# Patient Record
Sex: MALE | Race: WHITE | NOT HISPANIC OR LATINO | Employment: FULL TIME | URBAN - METROPOLITAN AREA
[De-identification: names, ages, dates, MRNs, and addresses within clinical notes are randomized per-mention and may not be internally consistent; named-entity substitution may affect disease eponyms.]

---

## 2017-03-15 ENCOUNTER — TRANSFERRED RECORDS (OUTPATIENT)
Dept: HEALTH INFORMATION MANAGEMENT | Facility: CLINIC | Age: 32
End: 2017-03-15

## 2017-10-06 ENCOUNTER — TRANSFERRED RECORDS (OUTPATIENT)
Dept: HEALTH INFORMATION MANAGEMENT | Facility: CLINIC | Age: 32
End: 2017-10-06

## 2018-04-20 ENCOUNTER — TRANSFERRED RECORDS (OUTPATIENT)
Dept: HEALTH INFORMATION MANAGEMENT | Facility: CLINIC | Age: 33
End: 2018-04-20

## 2019-11-05 ENCOUNTER — HEALTH MAINTENANCE LETTER (OUTPATIENT)
Age: 34
End: 2019-11-05

## 2020-11-22 ENCOUNTER — HEALTH MAINTENANCE LETTER (OUTPATIENT)
Age: 35
End: 2020-11-22

## 2021-09-19 ENCOUNTER — HEALTH MAINTENANCE LETTER (OUTPATIENT)
Age: 36
End: 2021-09-19

## 2022-01-09 ENCOUNTER — HEALTH MAINTENANCE LETTER (OUTPATIENT)
Age: 37
End: 2022-01-09

## 2022-09-25 PROBLEM — Z85.47 HISTORY OF TESTICULAR CANCER: Status: ACTIVE | Noted: 2022-09-25

## 2022-09-26 ENCOUNTER — OFFICE VISIT (OUTPATIENT)
Dept: INTERNAL MEDICINE | Facility: CLINIC | Age: 37
End: 2022-09-26
Payer: COMMERCIAL

## 2022-09-26 VITALS
TEMPERATURE: 97 F | HEIGHT: 72 IN | WEIGHT: 281.2 LBS | OXYGEN SATURATION: 97 % | DIASTOLIC BLOOD PRESSURE: 85 MMHG | BODY MASS INDEX: 38.09 KG/M2 | HEART RATE: 86 BPM | SYSTOLIC BLOOD PRESSURE: 152 MMHG

## 2022-09-26 DIAGNOSIS — Z23 NEED FOR VACCINATION: ICD-10-CM

## 2022-09-26 DIAGNOSIS — Z13.220 SCREENING FOR CHOLESTEROL LEVEL: ICD-10-CM

## 2022-09-26 DIAGNOSIS — E66.01 MORBID OBESITY (H): ICD-10-CM

## 2022-09-26 DIAGNOSIS — Z00.00 ROUTINE HISTORY AND PHYSICAL EXAMINATION OF ADULT: Primary | ICD-10-CM

## 2022-09-26 DIAGNOSIS — I10 BENIGN ESSENTIAL HYPERTENSION: ICD-10-CM

## 2022-09-26 DIAGNOSIS — Z13.1 SCREENING FOR DIABETES MELLITUS: ICD-10-CM

## 2022-09-26 DIAGNOSIS — Z23 NEED FOR PROPHYLACTIC VACCINATION AND INOCULATION AGAINST INFLUENZA: ICD-10-CM

## 2022-09-26 PROBLEM — E66.9 OBESITY (BMI 30-39.9): Status: ACTIVE | Noted: 2022-09-26

## 2022-09-26 PROBLEM — E78.00 PURE HYPERCHOLESTEROLEMIA: Status: ACTIVE | Noted: 2022-09-26

## 2022-09-26 PROBLEM — R73.01 IMPAIRED FASTING GLUCOSE: Status: ACTIVE | Noted: 2022-09-26

## 2022-09-26 LAB
ALBUMIN SERPL-MCNC: 3.9 G/DL (ref 3.4–5)
ALP SERPL-CCNC: 85 U/L (ref 40–150)
ALT SERPL W P-5'-P-CCNC: 36 U/L (ref 0–70)
ANION GAP SERPL CALCULATED.3IONS-SCNC: 7 MMOL/L (ref 3–14)
AST SERPL W P-5'-P-CCNC: 17 U/L (ref 0–45)
BILIRUB SERPL-MCNC: 0.3 MG/DL (ref 0.2–1.3)
BUN SERPL-MCNC: 16 MG/DL (ref 7–30)
CALCIUM SERPL-MCNC: 8.4 MG/DL (ref 8.5–10.1)
CHLORIDE BLD-SCNC: 109 MMOL/L (ref 94–109)
CHOLEST SERPL-MCNC: 241 MG/DL
CO2 SERPL-SCNC: 23 MMOL/L (ref 20–32)
CREAT SERPL-MCNC: 0.87 MG/DL (ref 0.66–1.25)
FASTING STATUS PATIENT QL REPORTED: YES
GFR SERPL CREATININE-BSD FRML MDRD: >90 ML/MIN/1.73M2
GLUCOSE BLD-MCNC: 118 MG/DL (ref 70–99)
HDLC SERPL-MCNC: 32 MG/DL
LDLC SERPL CALC-MCNC: 175 MG/DL
NONHDLC SERPL-MCNC: 209 MG/DL
POTASSIUM BLD-SCNC: 4.3 MMOL/L (ref 3.4–5.3)
PROT SERPL-MCNC: 7.1 G/DL (ref 6.8–8.8)
SODIUM SERPL-SCNC: 139 MMOL/L (ref 133–144)
TRIGL SERPL-MCNC: 169 MG/DL

## 2022-09-26 PROCEDURE — 90715 TDAP VACCINE 7 YRS/> IM: CPT | Performed by: INTERNAL MEDICINE

## 2022-09-26 PROCEDURE — 90471 IMMUNIZATION ADMIN: CPT | Performed by: INTERNAL MEDICINE

## 2022-09-26 PROCEDURE — 99385 PREV VISIT NEW AGE 18-39: CPT | Mod: 25 | Performed by: INTERNAL MEDICINE

## 2022-09-26 PROCEDURE — 36415 COLL VENOUS BLD VENIPUNCTURE: CPT | Performed by: INTERNAL MEDICINE

## 2022-09-26 PROCEDURE — 99214 OFFICE O/P EST MOD 30 MIN: CPT | Mod: 25 | Performed by: INTERNAL MEDICINE

## 2022-09-26 PROCEDURE — 90472 IMMUNIZATION ADMIN EACH ADD: CPT | Performed by: INTERNAL MEDICINE

## 2022-09-26 PROCEDURE — 80053 COMPREHEN METABOLIC PANEL: CPT | Performed by: INTERNAL MEDICINE

## 2022-09-26 PROCEDURE — 93000 ELECTROCARDIOGRAM COMPLETE: CPT | Performed by: INTERNAL MEDICINE

## 2022-09-26 PROCEDURE — 90686 IIV4 VACC NO PRSV 0.5 ML IM: CPT | Performed by: INTERNAL MEDICINE

## 2022-09-26 PROCEDURE — 80061 LIPID PANEL: CPT | Performed by: INTERNAL MEDICINE

## 2022-09-26 RX ORDER — AMLODIPINE BESYLATE 5 MG/1
5 TABLET ORAL DAILY
Qty: 90 TABLET | Refills: 3 | Status: SHIPPED | OUTPATIENT
Start: 2022-09-26 | End: 2022-10-24

## 2022-09-26 ASSESSMENT — ENCOUNTER SYMPTOMS
SHORTNESS OF BREATH: 0
ARTHRALGIAS: 0
WEAKNESS: 0
PARESTHESIAS: 0
DIZZINESS: 0
FREQUENCY: 0
DIARRHEA: 0
FEVER: 0
DYSURIA: 0
HEADACHES: 0
CONSTIPATION: 0
HEMATOCHEZIA: 0
NAUSEA: 0
HEMATURIA: 0
JOINT SWELLING: 0
MYALGIAS: 0
CHILLS: 0
PALPITATIONS: 0
HEARTBURN: 0
ABDOMINAL PAIN: 0
COUGH: 0
NERVOUS/ANXIOUS: 1
SORE THROAT: 0
EYE PAIN: 0

## 2022-09-26 NOTE — PATIENT INSTRUCTIONS
Flu shot and tetanus booster today.    EKG today.    ---    Please stop at the  on the way out today to schedule a blood pressure follow-up for 2-4 weeks from now (show them the white sheet).    ---    Fasting labs downstairs. Remember to pull a number and given them the salmon colored sheet.    ---    START amlodipine 5mg once daily. Uncommon side effect: ankle swelling.

## 2022-10-24 ENCOUNTER — OFFICE VISIT (OUTPATIENT)
Dept: INTERNAL MEDICINE | Facility: CLINIC | Age: 37
End: 2022-10-24
Payer: COMMERCIAL

## 2022-10-24 VITALS
OXYGEN SATURATION: 99 % | BODY MASS INDEX: 27.53 KG/M2 | TEMPERATURE: 98.3 F | WEIGHT: 203 LBS | HEART RATE: 85 BPM | DIASTOLIC BLOOD PRESSURE: 84 MMHG | SYSTOLIC BLOOD PRESSURE: 134 MMHG

## 2022-10-24 DIAGNOSIS — I10 BENIGN ESSENTIAL HYPERTENSION: Primary | ICD-10-CM

## 2022-10-24 PROCEDURE — 99213 OFFICE O/P EST LOW 20 MIN: CPT | Performed by: INTERNAL MEDICINE

## 2022-10-24 RX ORDER — AMLODIPINE BESYLATE 5 MG/1
5 TABLET ORAL DAILY
Qty: 90 TABLET | Refills: 3 | Status: SHIPPED | OUTPATIENT
Start: 2022-10-24 | End: 2023-10-02

## 2022-10-24 ASSESSMENT — PAIN SCALES - GENERAL: PAINLEVEL: NO PAIN (0)

## 2022-10-24 NOTE — PROGRESS NOTES
ASSESSMENT/PLAN                                                      (I10) Benign essential hypertension  (primary encounter diagnosis)  Comment: well-controlled on current regimen.    Plan: continue present management; refills provided.     Bharti Rodriguez MD   [address] [address]  T: [phone], F: [phone]    SUBJECTIVE                                                      Sampson Mas is a very pleasant 36 year old male who presents for blood pressure follow-up:    Patient was started on amlodipine 5 mg daily at last visit due to elevated blood pressure. Tolerating medication(s) well - no adverse side effects. Blood pressure is improved and within normal limits     OBJECTIVE                                                      /84 (BP Location: Right arm, Patient Position: Sitting, Cuff Size: Adult Large)   Pulse 85   Temp 98.3  F (36.8  C) (Oral)   Wt 92.1 kg (203 lb)   SpO2 99%   BMI 27.53 kg/m    Constitutional: well-appearing  Psych: normal judgment and insight; normal mood and affect; recent and remote memory intact    ---    (Note documentation was completed, in part, with [employer] voice-recognition software. Documentation was reviewed, but some grammatical, spelling, and word errors may remain.)

## 2023-09-26 ENCOUNTER — TELEPHONE (OUTPATIENT)
Dept: INTERNAL MEDICINE | Facility: CLINIC | Age: 38
End: 2023-09-26
Payer: COMMERCIAL

## 2023-09-26 NOTE — TELEPHONE ENCOUNTER
Agree with October 2 physical.  We can check blood pressure at that time. No intervention indicated in the interim.

## 2023-09-26 NOTE — TELEPHONE ENCOUNTER
"Received a call from the patient stating he had his wisdom teeth removed yesterday and he had his BP checked. Patient states he does not recall the bottom number but the top number (systolic) was 99. Patient states he was advised to call his clinic due to a lower BP reading. Patient states \"randomly not constantly\" he will feel a little light headed when standing. Patient does not have a BP cuff at home. Triage RN noticed patient is due for his annual physical. Assisted in getting the patient scheduled.     Appointments in Next Year      Oct 02, 2023 11:00 AM  (Arrive by 10:40 AM)  Provider Visit with Bharti Rodriguez MD  [facility] ([facility] ) [phone]          Patient to continue Amlodipine as scheduled until BP can be assessed in clinic?     Will route to PCP for review and recommendations.     Can we leave a detailed message on this number? YES  Phone number patient can be reached at: Home number on file [phone] (home)    Ciara Flores RN  [facility] Triage  "

## 2023-10-02 ENCOUNTER — ANCILLARY PROCEDURE (OUTPATIENT)
Dept: GENERAL RADIOLOGY | Facility: CLINIC | Age: 38
End: 2023-10-02
Attending: INTERNAL MEDICINE
Payer: COMMERCIAL

## 2023-10-02 ENCOUNTER — OFFICE VISIT (OUTPATIENT)
Dept: INTERNAL MEDICINE | Facility: CLINIC | Age: 38
End: 2023-10-02
Payer: COMMERCIAL

## 2023-10-02 VITALS
BODY MASS INDEX: 36.96 KG/M2 | SYSTOLIC BLOOD PRESSURE: 118 MMHG | WEIGHT: 272.9 LBS | DIASTOLIC BLOOD PRESSURE: 80 MMHG | OXYGEN SATURATION: 98 % | TEMPERATURE: 97.9 F | HEIGHT: 72 IN | HEART RATE: 88 BPM

## 2023-10-02 DIAGNOSIS — Z13.1 SCREENING FOR DIABETES MELLITUS: ICD-10-CM

## 2023-10-02 DIAGNOSIS — Z00.00 ROUTINE HISTORY AND PHYSICAL EXAMINATION OF ADULT: Primary | ICD-10-CM

## 2023-10-02 DIAGNOSIS — Z85.47 HISTORY OF TESTICULAR CANCER: ICD-10-CM

## 2023-10-02 DIAGNOSIS — E78.00 PURE HYPERCHOLESTEROLEMIA: ICD-10-CM

## 2023-10-02 DIAGNOSIS — R73.01 IMPAIRED FASTING GLUCOSE: ICD-10-CM

## 2023-10-02 DIAGNOSIS — E66.01 MORBID OBESITY (H): ICD-10-CM

## 2023-10-02 PROBLEM — I10 BENIGN ESSENTIAL HYPERTENSION: Status: RESOLVED | Noted: 2022-09-26 | Resolved: 2023-10-02

## 2023-10-02 LAB
AFP SERPL-MCNC: 4.6 NG/ML
ALBUMIN SERPL BCG-MCNC: 4.7 G/DL (ref 3.5–5.2)
ALP SERPL-CCNC: 111 U/L (ref 40–129)
ALT SERPL W P-5'-P-CCNC: 37 U/L (ref 0–70)
ANION GAP SERPL CALCULATED.3IONS-SCNC: 12 MMOL/L (ref 7–15)
AST SERPL W P-5'-P-CCNC: 20 U/L (ref 0–45)
BILIRUB SERPL-MCNC: 0.4 MG/DL
BUN SERPL-MCNC: 15.9 MG/DL (ref 6–20)
CALCIUM SERPL-MCNC: 9.7 MG/DL (ref 8.6–10)
CHLORIDE SERPL-SCNC: 103 MMOL/L (ref 98–107)
CHOLEST SERPL-MCNC: 275 MG/DL
CREAT SERPL-MCNC: 1.07 MG/DL (ref 0.67–1.17)
DEPRECATED HCO3 PLAS-SCNC: 26 MMOL/L (ref 22–29)
EGFRCR SERPLBLD CKD-EPI 2021: >90 ML/MIN/1.73M2
GLUCOSE SERPL-MCNC: 96 MG/DL (ref 70–99)
HBA1C MFR BLD: 5.2 % (ref 0–5.6)
HDLC SERPL-MCNC: 30 MG/DL
LDLC SERPL CALC-MCNC: 218 MG/DL
NONHDLC SERPL-MCNC: 245 MG/DL
POTASSIUM SERPL-SCNC: 4.2 MMOL/L (ref 3.4–5.3)
PROT SERPL-MCNC: 7.7 G/DL (ref 6.4–8.3)
SODIUM SERPL-SCNC: 141 MMOL/L (ref 135–145)
TRIGL SERPL-MCNC: 137 MG/DL

## 2023-10-02 PROCEDURE — 99395 PREV VISIT EST AGE 18-39: CPT | Performed by: INTERNAL MEDICINE

## 2023-10-02 PROCEDURE — 82105 ALPHA-FETOPROTEIN SERUM: CPT | Performed by: INTERNAL MEDICINE

## 2023-10-02 PROCEDURE — 80053 COMPREHEN METABOLIC PANEL: CPT | Performed by: INTERNAL MEDICINE

## 2023-10-02 PROCEDURE — 83036 HEMOGLOBIN GLYCOSYLATED A1C: CPT | Performed by: INTERNAL MEDICINE

## 2023-10-02 PROCEDURE — 36415 COLL VENOUS BLD VENIPUNCTURE: CPT | Performed by: INTERNAL MEDICINE

## 2023-10-02 PROCEDURE — 71046 X-RAY EXAM CHEST 2 VIEWS: CPT | Mod: TC | Performed by: RADIOLOGY

## 2023-10-02 PROCEDURE — 80061 LIPID PANEL: CPT | Performed by: INTERNAL MEDICINE

## 2023-10-02 ASSESSMENT — ENCOUNTER SYMPTOMS
FEVER: 0
FREQUENCY: 0
ABDOMINAL PAIN: 0
PALPITATIONS: 0
ARTHRALGIAS: 0
NAUSEA: 0
CHILLS: 0
HEADACHES: 0
MYALGIAS: 0
HEMATOCHEZIA: 0
HEARTBURN: 0
WEAKNESS: 0
DYSURIA: 0
DIARRHEA: 0
SORE THROAT: 0
DIZZINESS: 0
CONSTIPATION: 0
SHORTNESS OF BREATH: 0
COUGH: 0
JOINT SWELLING: 0
HEMATURIA: 0
EYE PAIN: 0
NERVOUS/ANXIOUS: 0
PARESTHESIAS: 0

## 2023-10-02 NOTE — PROGRESS NOTES
ASSESSMENT/PLAN                                                       (Z00.00) Routine history and physical examination of adult  (primary encounter diagnosis)  Comment: PMH, PSH, FH, SH, medications, allergies, immunizations, and preventative health measures reviewed and updated as appropriate.  Plan: see below for plans.      (E78.00) Pure hypercholesterolemia  (R73.01) Impaired fasting glucose  (Z13.1) Screening for diabetes mellitus  Plan: nonfasting labs today.    (Z85.47) History of testicular cancer  Plan: AFP level and CXR today.    (E66.01) Morbid obesity (H)  Comment: obesity +  comorbidities (IFG and HLD); known issue that I take into account for their medical decisions, no current exacerbations or new concerns.    Bharti Rodriguez MD   [address] [address]  T: [phone], F: [phone]    SUBJECTIVE                                                      Sampson Mas is a very pleasant 37 year old male who presents for a physical.    ROS:  Constitutional: no unintentional weight loss or gain reported; no fevers, chills, or sweats  reported    Cardiovascular: no chest pain, palpitations, or edema reported  Respiratory: no cough, wheezing, shortness of breath, or dyspnea on exertion reported  Gastrointestinal: no nausea, vomiting, constipation, diarrhea, or abdominal pain reported  Genitourinary: no urinary frequency, urgency, dysuria, or hematuria reported  Integumentary: no rash or pruritus reported  Musculoskeletal: no back pain, muscle pain, joint pain, or joint swelling reported  Neurologic: no focal weakness, numbness, or tingling reported  Hematologic: no easy bruising or bleeding reported  Endocrine: no heat or cold intolerance reported; no polyuria or polydipsia reported  Psychiatric: no anxiety or depression reported    Past Medical History:   Diagnosis Date    History of testicular cancer 2014    s/p orchiectomy and chemotherapy    Impaired  fasting glucose     Obesity (BMI 30-39.9)     Pure hypercholesterolemia      Past Surgical History:   Procedure Laterality Date    ORCHIECTOMY INGUINAL Left 09/02/2014    Procedure: ORCHIECTOMY INGUINAL;  Surgeon: Grover Singh MD;  Location: [facility]     Family History   Problem Relation Age of Onset    Hypertension Mother     Hyperlipidemia Mother     Myocardial Infarction Mother 60    Hyperlipidemia Father     Prostate Cancer Paternal Grandfather     Cerebrovascular Disease No family hx of     Colon Cancer No family hx of      Social History     Occupational History    Occupation: [employer] work   Tobacco Use    Smoking status: Never    Smokeless tobacco: Never   Vaping Use    Vaping Use: Never used   Substance and Sexual Activity    Alcohol use: Yes     Comment: rare    Drug use: No    Sexual activity: Not Currently   Social History Narrative    Single.    No kids.    Walks daily.      No Known Allergies    Immunization History   Administered Date(s) Administered    COVID-19 Monovalent 18+ (Moderna) 03/16/2021, 04/13/2021, 11/16/2021    Historical DTP/aP 02/07/1986, 04/16/1986, 08/08/1986, 03/17/1990, 08/22/1991, 08/21/1998    Influenza Vaccine >6 months (Alfuria,Fluzone) 11/16/2021, 09/26/2022    MMR 03/17/1990, 08/21/1998    Polio, Unspecified  02/07/1986, 04/16/1986, 03/17/1990, 08/22/1991    TD,PF 7+ (Tenivac) 03/04/2008    TDAP (Adacel,Boostrix) 09/26/2022    Td (Adult), Adsorbed 03/04/2008     PREVENTATIVE HEALTH                                                      BMI: obese  Blood pressure: within normal limits   Prostate CA Screening: not medically indicated at this time   Colon CA screening: not medically indicated at this time   Lung CA screening: n/a   AAA screening: n/a   Screening cholesterol: DUE  Screening diabetes: DUE  STD testing: not sexually active  Alcohol misuse screening: alcohol use reviewed - no intervention indicated at this time  Immunizations: reviewed;  flu shot and COVID-19 booster  DUE    OBJECTIVE                                                      /80   Pulse 88   Temp 97.9  F (36.6  C)   Ht 1.829 m (6')   Wt 123.8 kg (272 lb 14.4 oz)   SpO2 98%   BMI 37.01 kg/m    Constitutional: well-appearing  Head, Ears, and Eyes: normocephalic; normal external auditory canal and pinna; tympanic membranes visualized and normal; normal lids and conjunctivae  Neck: supple, symmetric, no thyromegaly or lymphadenopathy  Respiratory: normal respiratory effort; clear to auscultation bilaterally  Cardiovascular: regular rate and rhythm; no edema  Gastrointestinal: soft, non-tender, and non-distended; no organomegaly or masses  Musculoskeletal: normal gait and station  Psych: normal judgment and insight; normal mood and affect; recent and remote memory intact    ---    (Note was completed, in part, with [employer] voice-recognition software. Documentation was reviewed, but some grammatical, spelling, and word errors may remain.)

## 2023-10-03 ENCOUNTER — TELEPHONE (OUTPATIENT)
Dept: INTERNAL MEDICINE | Facility: CLINIC | Age: 38
End: 2023-10-03
Payer: COMMERCIAL

## 2023-10-03 DIAGNOSIS — E78.00 PURE HYPERCHOLESTEROLEMIA: Primary | ICD-10-CM

## 2023-10-03 RX ORDER — ROSUVASTATIN CALCIUM 10 MG/1
10 TABLET, COATED ORAL DAILY
Qty: 90 TABLET | Refills: 3 | Status: SHIPPED | OUTPATIENT
Start: 2023-10-03 | End: 2024-09-18

## 2023-10-03 NOTE — TELEPHONE ENCOUNTER
I have prescribed Crsetor 10mg daily. This medication is very well tolerated by the vast majority of individuals taking it.    Less than 5% of individuals taking this medication experience muscle aches. If this occurs, please let me know. Starting this medication will significantly reduce your risk of heart attack and stroke going forward.

## 2023-10-03 NOTE — TELEPHONE ENCOUNTER
"TO PCP    Patient calling stating, \"The doctor wanted to get me on some cholesterol medication. She wanted to know if that was ok and I am ok with that, whatever she wants to put me on.\"    Pharmacy pended.     Araceli Wilburn RN on 10/3/2023 at 4:29 PM    "

## 2023-10-03 NOTE — TELEPHONE ENCOUNTER
Called and spoke to the patient. Relayed message from the provider below. Patient expressed understanding and had no additional questions at this time.     Ciara Flores RN

## 2024-09-18 ENCOUNTER — MYC REFILL (OUTPATIENT)
Dept: INTERNAL MEDICINE | Facility: CLINIC | Age: 39
End: 2024-09-18
Payer: COMMERCIAL

## 2024-09-18 DIAGNOSIS — E78.00 PURE HYPERCHOLESTEROLEMIA: ICD-10-CM

## 2024-09-18 RX ORDER — ROSUVASTATIN CALCIUM 10 MG/1
10 TABLET, COATED ORAL DAILY
Qty: 90 TABLET | Refills: 0 | Status: SHIPPED | OUTPATIENT
Start: 2024-09-18

## 2024-09-30 PROBLEM — E66.01 MORBID OBESITY (H): Status: RESOLVED | Noted: 2023-10-02 | Resolved: 2024-09-30

## 2024-10-01 ENCOUNTER — OFFICE VISIT (OUTPATIENT)
Dept: INTERNAL MEDICINE | Facility: CLINIC | Age: 39
End: 2024-10-01
Payer: COMMERCIAL

## 2024-10-01 VITALS
RESPIRATION RATE: 10 BRPM | TEMPERATURE: 98.2 F | OXYGEN SATURATION: 97 % | BODY MASS INDEX: 39.1 KG/M2 | HEART RATE: 95 BPM | HEIGHT: 72 IN | DIASTOLIC BLOOD PRESSURE: 80 MMHG | WEIGHT: 288.7 LBS | SYSTOLIC BLOOD PRESSURE: 124 MMHG

## 2024-10-01 DIAGNOSIS — F41.9 ANXIETY: ICD-10-CM

## 2024-10-01 DIAGNOSIS — R73.01 IMPAIRED FASTING GLUCOSE: ICD-10-CM

## 2024-10-01 DIAGNOSIS — Z13.1 SCREENING FOR DIABETES MELLITUS: ICD-10-CM

## 2024-10-01 DIAGNOSIS — Z23 NEED FOR PROPHYLACTIC VACCINATION AND INOCULATION AGAINST INFLUENZA: ICD-10-CM

## 2024-10-01 DIAGNOSIS — Z00.00 ROUTINE HISTORY AND PHYSICAL EXAMINATION OF ADULT: Primary | ICD-10-CM

## 2024-10-01 DIAGNOSIS — E66.01 MORBID OBESITY (H): ICD-10-CM

## 2024-10-01 DIAGNOSIS — Z85.47 HISTORY OF TESTICULAR CANCER: ICD-10-CM

## 2024-10-01 DIAGNOSIS — E78.00 PURE HYPERCHOLESTEROLEMIA: ICD-10-CM

## 2024-10-01 DIAGNOSIS — M79.673 HEEL PAIN, UNSPECIFIED LATERALITY: ICD-10-CM

## 2024-10-01 LAB
EST. AVERAGE GLUCOSE BLD GHB EST-MCNC: 111 MG/DL
HBA1C MFR BLD: 5.5 % (ref 0–5.6)

## 2024-10-01 PROCEDURE — 90471 IMMUNIZATION ADMIN: CPT | Performed by: INTERNAL MEDICINE

## 2024-10-01 PROCEDURE — 80061 LIPID PANEL: CPT | Performed by: INTERNAL MEDICINE

## 2024-10-01 PROCEDURE — 80053 COMPREHEN METABOLIC PANEL: CPT | Performed by: INTERNAL MEDICINE

## 2024-10-01 PROCEDURE — 36415 COLL VENOUS BLD VENIPUNCTURE: CPT | Performed by: INTERNAL MEDICINE

## 2024-10-01 PROCEDURE — 90656 IIV3 VACC NO PRSV 0.5 ML IM: CPT | Performed by: INTERNAL MEDICINE

## 2024-10-01 PROCEDURE — 83036 HEMOGLOBIN GLYCOSYLATED A1C: CPT | Performed by: INTERNAL MEDICINE

## 2024-10-01 PROCEDURE — 99395 PREV VISIT EST AGE 18-39: CPT | Mod: 25 | Performed by: INTERNAL MEDICINE

## 2024-10-01 PROCEDURE — 84443 ASSAY THYROID STIM HORMONE: CPT | Performed by: INTERNAL MEDICINE

## 2024-10-01 RX ORDER — HYDROXYZINE HYDROCHLORIDE 10 MG/1
10-20 TABLET, FILM COATED ORAL 3 TIMES DAILY PRN
Qty: 30 TABLET | Refills: 11 | Status: SHIPPED | OUTPATIENT
Start: 2024-10-01

## 2024-10-01 SDOH — HEALTH STABILITY: PHYSICAL HEALTH: ON AVERAGE, HOW MANY MINUTES DO YOU ENGAGE IN EXERCISE AT THIS LEVEL?: 60 MIN

## 2024-10-01 SDOH — HEALTH STABILITY: PHYSICAL HEALTH: ON AVERAGE, HOW MANY DAYS PER WEEK DO YOU ENGAGE IN MODERATE TO STRENUOUS EXERCISE (LIKE A BRISK WALK)?: 5 DAYS

## 2024-10-01 ASSESSMENT — PAIN SCALES - GENERAL: PAINLEVEL: NO PAIN (0)

## 2024-10-01 ASSESSMENT — SOCIAL DETERMINANTS OF HEALTH (SDOH): HOW OFTEN DO YOU GET TOGETHER WITH FRIENDS OR RELATIVES?: MORE THAN THREE TIMES A WEEK

## 2024-10-01 NOTE — PROGRESS NOTES
ASSESSMENT/PLAN                                                       (Z00.00) Routine history and physical examination of adult  (primary encounter diagnosis)  Comment: PMH, PSH, FH, SH, medications, allergies, immunizations, and preventative health measures reviewed and updated as appropriate.  Plan: see below for plans.      (Z85.47) History of testicular cancer  Comment: 2014; s/p orchiectomy and chemotherapy.    (E78.00) Pure hypercholesterolemia  (R73.01) Impaired fasting glucose  (Z13.1) Screening for diabetes mellitus  Plan: non-fasting labs today.    (Z23) Need for prophylactic vaccination and inoculation against influenza  Comment: flu shot given today.    (E66.01) Morbid obesity (H)  Comment: obesity + comorbidities (HLD, IFG); known issue that I take into account for their medical decisions, no current exacerbations or new concerns.    (F41.9) Anxiety  Plan: TSH reflex with above labs; TRIAL of hydroxyzine as needed for acute anxiety; if symptoms worsen, change, or do not improve, patient to contact MD.      (S64.607) Heel pain, unspecified laterality  Comment: Suspect plantar fasciitis.  Plan: patient encouraged to wear supportive footwear at all times and to roll bottom left foot over a hard ball regularly; if symptoms worsen, change, or do not improve, patient to contact MD.      Bharti Rodriguez MD   [address] [address]  T: [phone], F: [phone]    SUBJECTIVE                                                      Sampson Mas is a very pleasant 38 year old male who presents for a physical.    Patient has been struggling with anxiety as of late.  Sometimes has difficulty sleeping and focusing on tasks due to anxiety.  No prior history of or treatment for anxiety in the past.    ROS:  Constitutional: no unintentional weight loss or gain reported; no fevers, chills, or sweats  reported    Cardiovascular: no chest pain, palpitations, or edema  reported  Respiratory: no cough, wheezing, shortness of breath, or dyspnea on exertion reported  Gastrointestinal: no nausea, vomiting, constipation, diarrhea, or abdominal pain reported  Genitourinary: no urinary frequency, urgency, dysuria, or hematuria reported  Integumentary: no rash or pruritus reported  Musculoskeletal: heel pain with weight bearing - worst when standing after a period of rest; worse with barefoot (vs shoes)  Neurologic: no focal weakness, numbness, or tingling reported  Hematologic: no easy bruising or bleeding reported  Endocrine: no heat or cold intolerance reported; no polyuria or polydipsia reported  Psychiatric: see above    Past Medical History:   Diagnosis Date    History of testicular cancer 2014    s/p orchiectomy and chemotherapy    Impaired fasting glucose     Obesity (BMI 30-39.9)     Pure hypercholesterolemia      Past Surgical History:   Procedure Laterality Date    ORCHIECTOMY INGUINAL Left 09/02/2014    Procedure: ORCHIECTOMY INGUINAL;  Surgeon: Grover Singh MD;  Location[facility]     Family History   Problem Relation Age of Onset    Hypertension Mother     Hyperlipidemia Mother     Myocardial Infarction Mother 60    Hyperlipidemia Father     Prostate Cancer Paternal Grandfather     Cerebrovascular Disease No family hx of     Colon Cancer No family hx of      Social History     Occupational History    Occupation: [employer] work   Tobacco Use    Smoking status: Never    Smokeless tobacco: Never   Vaping Use    Vaping status: Never Used   Substance and Sexual Activity    Alcohol use: Yes     Comment: rare    Drug use: No    Sexual activity: Not Currently   Social History Narrative    Single.    No kids.    Walks daily.        No Known Allergies    Current Outpatient Medications   Medication Sig    rosuvastatin (CRESTOR) 10 MG tablet Take 1 tablet (10 mg) by mouth daily.     Immunization History   Administered Date(s) Administered    COVID-19 Monovalent 18+ (Moderna) 03/16/2021,  04/13/2021, 11/16/2021    Historical DTP/aP 02/07/1986, 04/16/1986, 08/08/1986, 03/17/1990, 08/22/1991, 08/21/1998    Influenza Vaccine >6 months,quad, PF 11/16/2021, 09/26/2022    MMR 03/17/1990, 08/21/1998    Polio, Unspecified 02/07/1986, 04/16/1986, 03/17/1990, 08/22/1991    TD,PF 7+ (Tenivac) 03/04/2008    TDAP (Adacel,Boostrix) 09/26/2022    Td (Adult), Adsorbed 03/04/2008     PREVENTATIVE HEALTH                                                      BMI: obese  Blood pressure: within normal limits   Prostate CA Screening: not medically indicated at this time   Colon CA screening: not medically indicated at this time   Lung CA screening: n/a   AAA screening: n/a   Screening cholesterol: DUE  Screening diabetes: DUE  STD testing: not sexually active  Alcohol misuse screening: alcohol use reviewed - no intervention indicated at this time  Immunizations: reviewed;  flu shot DUE and COVID-19 booster DUE - previously declined (not discussed at today's visit)    OBJECTIVE                                                      /80   Pulse 95   Temp 98.2  F (36.8  C) (Oral)   Resp 10   Ht 1.829 m (6')   Wt 131 kg (288 lb 11.2 oz)   SpO2 97%   BMI 39.15 kg/m    Constitutional: well-appearing  Head, Ears, and Eyes: normocephalic; normal external auditory canal and pinna; tympanic membranes visualized and normal; normal lids and conjunctivae  Neck: supple, symmetric, no thyromegaly or lymphadenopathy  Respiratory: normal respiratory effort; clear to auscultation bilaterally  Cardiovascular: regular rate and rhythm; no edema  Gastrointestinal: soft, non-tender, and non-distended; no organomegaly or masses  Musculoskeletal: normal gait and station  Psych: normal judgment and insight; normal mood and affect; recent and remote memory intact    ---    (Note was completed, in part, with [employer] voice-recognition software. Documentation was reviewed, but some grammatical, spelling, and word errors may remain.)

## 2024-10-02 LAB
ALBUMIN SERPL BCG-MCNC: 4.5 G/DL (ref 3.5–5.2)
ALP SERPL-CCNC: 96 U/L (ref 40–150)
ALT SERPL W P-5'-P-CCNC: 41 U/L (ref 0–70)
ANION GAP SERPL CALCULATED.3IONS-SCNC: 12 MMOL/L (ref 7–15)
AST SERPL W P-5'-P-CCNC: 22 U/L (ref 0–45)
BILIRUB SERPL-MCNC: 0.3 MG/DL
BUN SERPL-MCNC: 14.8 MG/DL (ref 6–20)
CALCIUM SERPL-MCNC: 9.1 MG/DL (ref 8.8–10.4)
CHLORIDE SERPL-SCNC: 104 MMOL/L (ref 98–107)
CHOLEST SERPL-MCNC: 214 MG/DL
CREAT SERPL-MCNC: 1.04 MG/DL (ref 0.67–1.17)
EGFRCR SERPLBLD CKD-EPI 2021: >90 ML/MIN/1.73M2
FASTING STATUS PATIENT QL REPORTED: NO
FASTING STATUS PATIENT QL REPORTED: NO
GLUCOSE SERPL-MCNC: 98 MG/DL (ref 70–99)
HCO3 SERPL-SCNC: 24 MMOL/L (ref 22–29)
HDLC SERPL-MCNC: 31 MG/DL
LDLC SERPL CALC-MCNC: 136 MG/DL
NONHDLC SERPL-MCNC: 183 MG/DL
POTASSIUM SERPL-SCNC: 4.1 MMOL/L (ref 3.4–5.3)
PROT SERPL-MCNC: 7.3 G/DL (ref 6.4–8.3)
SODIUM SERPL-SCNC: 140 MMOL/L (ref 135–145)
TRIGL SERPL-MCNC: 233 MG/DL
TSH SERPL DL<=0.005 MIU/L-ACNC: 1.28 UIU/ML (ref 0.3–4.2)

## 2024-12-21 DIAGNOSIS — E78.00 PURE HYPERCHOLESTEROLEMIA: ICD-10-CM

## 2024-12-23 RX ORDER — ROSUVASTATIN CALCIUM 10 MG/1
10 TABLET, COATED ORAL DAILY
Qty: 90 TABLET | Refills: 2 | Status: SHIPPED | OUTPATIENT
Start: 2024-12-23

## 2025-09-01 ENCOUNTER — PATIENT OUTREACH (OUTPATIENT)
Dept: CARE COORDINATION | Facility: CLINIC | Age: 40
End: 2025-09-01
Payer: COMMERCIAL